# Patient Record
Sex: MALE | URBAN - METROPOLITAN AREA
[De-identification: names, ages, dates, MRNs, and addresses within clinical notes are randomized per-mention and may not be internally consistent; named-entity substitution may affect disease eponyms.]

---

## 2023-10-30 ENCOUNTER — APPOINTMENT (RX ONLY)
Dept: URBAN - METROPOLITAN AREA CLINIC 26 | Facility: CLINIC | Age: 43
Setting detail: DERMATOLOGY
End: 2023-10-30

## 2023-10-30 DIAGNOSIS — L82.1 OTHER SEBORRHEIC KERATOSIS: ICD-10-CM

## 2023-10-30 DIAGNOSIS — L81.4 OTHER MELANIN HYPERPIGMENTATION: ICD-10-CM

## 2023-10-30 PROCEDURE — 99203 OFFICE O/P NEW LOW 30 MIN: CPT

## 2023-10-30 PROCEDURE — ? COUNSELING

## 2023-10-30 ASSESSMENT — LOCATION ZONE DERM: LOCATION ZONE: FACE

## 2023-10-30 ASSESSMENT — LOCATION SIMPLE DESCRIPTION DERM
LOCATION SIMPLE: RIGHT CHEEK
LOCATION SIMPLE: LEFT CHEEK

## 2023-10-30 ASSESSMENT — LOCATION DETAILED DESCRIPTION DERM
LOCATION DETAILED: RIGHT INFERIOR LATERAL MALAR CHEEK
LOCATION DETAILED: LEFT CENTRAL MALAR CHEEK

## 2023-12-28 ENCOUNTER — APPOINTMENT (RX ONLY)
Dept: URBAN - METROPOLITAN AREA CLINIC 26 | Facility: CLINIC | Age: 43
Setting detail: DERMATOLOGY
End: 2023-12-28

## 2023-12-28 DIAGNOSIS — L81.4 OTHER MELANIN HYPERPIGMENTATION: ICD-10-CM

## 2023-12-28 DIAGNOSIS — D22 MELANOCYTIC NEVI: ICD-10-CM

## 2023-12-28 DIAGNOSIS — L82.1 OTHER SEBORRHEIC KERATOSIS: ICD-10-CM

## 2023-12-28 DIAGNOSIS — D18.0 HEMANGIOMA: ICD-10-CM

## 2023-12-28 PROBLEM — D18.01 HEMANGIOMA OF SKIN AND SUBCUTANEOUS TISSUE: Status: ACTIVE | Noted: 2023-12-28

## 2023-12-28 PROBLEM — D22.5 MELANOCYTIC NEVI OF TRUNK: Status: ACTIVE | Noted: 2023-12-28

## 2023-12-28 PROCEDURE — 99213 OFFICE O/P EST LOW 20 MIN: CPT

## 2023-12-28 PROCEDURE — ? SUNSCREEN RECOMMENDATIONS

## 2023-12-28 PROCEDURE — ? FULL BODY SKIN EXAM

## 2023-12-28 PROCEDURE — ? COUNSELING

## 2023-12-28 ASSESSMENT — LOCATION ZONE DERM: LOCATION ZONE: TRUNK

## 2023-12-28 ASSESSMENT — LOCATION SIMPLE DESCRIPTION DERM: LOCATION SIMPLE: UPPER BACK

## 2023-12-28 ASSESSMENT — LOCATION DETAILED DESCRIPTION DERM: LOCATION DETAILED: SUPERIOR THORACIC SPINE

## 2025-02-03 ENCOUNTER — APPOINTMENT (EMERGENCY)
Dept: RADIOLOGY | Facility: HOSPITAL | Age: 45
End: 2025-02-03

## 2025-02-03 ENCOUNTER — APPOINTMENT (EMERGENCY)
Dept: RADIOLOGY | Facility: HOSPITAL | Age: 45
End: 2025-02-03
Attending: STUDENT IN AN ORGANIZED HEALTH CARE EDUCATION/TRAINING PROGRAM

## 2025-02-03 ENCOUNTER — HOSPITAL ENCOUNTER (EMERGENCY)
Facility: HOSPITAL | Age: 45
Discharge: HOME | End: 2025-02-03
Attending: EMERGENCY MEDICINE
Payer: COMMERCIAL

## 2025-02-03 VITALS
RESPIRATION RATE: 18 BRPM | BODY MASS INDEX: 35.52 KG/M2 | WEIGHT: 268 LBS | HEIGHT: 73 IN | SYSTOLIC BLOOD PRESSURE: 156 MMHG | HEART RATE: 83 BPM | TEMPERATURE: 97.9 F | DIASTOLIC BLOOD PRESSURE: 86 MMHG | OXYGEN SATURATION: 99 %

## 2025-02-03 DIAGNOSIS — S62.639B OPEN FRACTURE OF DISTAL PHALANX OF DIGIT OF LEFT HAND: ICD-10-CM

## 2025-02-03 DIAGNOSIS — S68.119A: Primary | ICD-10-CM

## 2025-02-03 LAB
ALBUMIN SERPL-MCNC: 5 G/DL (ref 3.5–5.7)
ALP SERPL-CCNC: 48 IU/L (ref 34–125)
ALT SERPL-CCNC: 43 IU/L (ref 7–52)
ANION GAP SERPL CALC-SCNC: 9 MEQ/L (ref 3–15)
APTT PPP: 26 SEC (ref 23–35)
AST SERPL-CCNC: 26 IU/L (ref 13–39)
BASOPHILS # BLD: 0.08 K/UL
BASOPHILS NFR BLD: 1 %
BILIRUB SERPL-MCNC: 0.8 MG/DL (ref 0.3–1.2)
BUN SERPL-MCNC: 18 MG/DL (ref 7–25)
CALCIUM SERPL-MCNC: 10 MG/DL (ref 8.6–10.3)
CHLORIDE SERPL-SCNC: 100 MEQ/L (ref 98–107)
CO2 SERPL-SCNC: 29 MEQ/L (ref 21–31)
CREAT SERPL-MCNC: 1 MG/DL (ref 0.7–1.3)
DIFFERENTIAL METHOD BLD: NORMAL
EGFRCR SERPLBLD CKD-EPI 2021: 43.4 ML/MIN/1.73M*2
EOSINOPHIL # BLD: 0.07 K/UL
EOSINOPHIL NFR BLD: 0.8 %
ERYTHROCYTE [DISTWIDTH] IN BLOOD BY AUTOMATED COUNT: 12.7 %
ETHANOL SERPL-MCNC: <10 MG/DL
GLUCOSE SERPL-MCNC: 100 MG/DL (ref 70–99)
HCT VFR BLD AUTO: 47.2 %
HGB BLD-MCNC: 16.8 G/DL
IMM GRANULOCYTES # BLD AUTO: 0.06 K/UL
IMM GRANULOCYTES NFR BLD AUTO: 0.7 %
INR PPP: 1
LABORATORY COMMENT REPORT: NORMAL
LABORATORY COMMENT REPORT: NORMAL
LACTATE SERPL-SCNC: 2.3 MMOL/L (ref 0.4–2)
LYMPHOCYTES # BLD: 2.71 K/UL
LYMPHOCYTES NFR BLD: 32.3 %
MCH RBC QN AUTO: 29.6 PG
MCHC RBC AUTO-ENTMCNC: 35.6 G/DL
MCV RBC AUTO: 83.2 FL
MONOCYTES # BLD: 0.92 K/UL
MONOCYTES NFR BLD: 11 %
NEUTROPHILS # BLD: 4.54 K/UL
NEUTS SEG NFR BLD: 54.2 %
NRBC BLD-RTO: 0 %
PLATELET # BLD AUTO: 213 K/UL
PMV BLD AUTO: 10.7 FL
POTASSIUM SERPL-SCNC: 4.1 MEQ/L (ref 3.5–5.1)
PROT SERPL-MCNC: 7.7 G/DL (ref 6–8.2)
PROTHROMBIN TIME: 13.3 SEC (ref 12.2–14.5)
RBC # BLD AUTO: 5.67 M/UL
SODIUM SERPL-SCNC: 138 MEQ/L (ref 136–145)
SPECIMEN EXP DATE BLD: NORMAL
WBC # BLD AUTO: 8.38 K/UL

## 2025-02-03 PROCEDURE — 85730 THROMBOPLASTIN TIME PARTIAL: CPT | Performed by: STUDENT IN AN ORGANIZED HEALTH CARE EDUCATION/TRAINING PROGRAM

## 2025-02-03 PROCEDURE — 99285 EMERGENCY DEPT VISIT HI MDM: CPT | Mod: 25

## 2025-02-03 PROCEDURE — 73120 X-RAY EXAM OF HAND: CPT | Mod: XU,LT

## 2025-02-03 PROCEDURE — 0PSV0ZZ REPOSITION LEFT FINGER PHALANX, OPEN APPROACH: ICD-10-PCS | Performed by: SURGERY

## 2025-02-03 PROCEDURE — 73100 X-RAY EXAM OF WRIST: CPT | Mod: LT

## 2025-02-03 PROCEDURE — 73130 X-RAY EXAM OF HAND: CPT | Mod: LT

## 2025-02-03 PROCEDURE — 80053 COMPREHEN METABOLIC PANEL: CPT | Performed by: STUDENT IN AN ORGANIZED HEALTH CARE EDUCATION/TRAINING PROGRAM

## 2025-02-03 PROCEDURE — 25000000 HC PHARMACY GENERAL: Performed by: STUDENT IN AN ORGANIZED HEALTH CARE EDUCATION/TRAINING PROGRAM

## 2025-02-03 PROCEDURE — 96375 TX/PRO/DX INJ NEW DRUG ADDON: CPT

## 2025-02-03 PROCEDURE — 90715 TDAP VACCINE 7 YRS/> IM: CPT | Performed by: STUDENT IN AN ORGANIZED HEALTH CARE EDUCATION/TRAINING PROGRAM

## 2025-02-03 PROCEDURE — 3E0234Z INTRODUCTION OF SERUM, TOXOID AND VACCINE INTO MUSCLE, PERCUTANEOUS APPROACH: ICD-10-PCS | Performed by: EMERGENCY MEDICINE

## 2025-02-03 PROCEDURE — 99214 OFFICE O/P EST MOD 30 MIN: CPT | Performed by: SURGERY

## 2025-02-03 PROCEDURE — 68200000 HC TRAUMA RSPNS W/O CRIT CARE

## 2025-02-03 PROCEDURE — G0480 DRUG TEST DEF 1-7 CLASSES: HCPCS | Performed by: STUDENT IN AN ORGANIZED HEALTH CARE EDUCATION/TRAINING PROGRAM

## 2025-02-03 PROCEDURE — 85610 PROTHROMBIN TIME: CPT | Performed by: STUDENT IN AN ORGANIZED HEALTH CARE EDUCATION/TRAINING PROGRAM

## 2025-02-03 PROCEDURE — 63600000 HC DRUGS/DETAIL CODE: Mod: JZ | Performed by: STUDENT IN AN ORGANIZED HEALTH CARE EDUCATION/TRAINING PROGRAM

## 2025-02-03 PROCEDURE — 85025 COMPLETE CBC W/AUTO DIFF WBC: CPT | Performed by: STUDENT IN AN ORGANIZED HEALTH CARE EDUCATION/TRAINING PROGRAM

## 2025-02-03 PROCEDURE — 3E03329 INTRODUCTION OF OTHER ANTI-INFECTIVE INTO PERIPHERAL VEIN, PERCUTANEOUS APPROACH: ICD-10-PCS | Performed by: SURGERY

## 2025-02-03 PROCEDURE — 63600000 HC DRUGS/DETAIL CODE: Mod: JZ | Performed by: SURGERY

## 2025-02-03 PROCEDURE — 96374 THER/PROPH/DIAG INJ IV PUSH: CPT | Mod: 59

## 2025-02-03 PROCEDURE — 25000000 HC PHARMACY GENERAL

## 2025-02-03 PROCEDURE — 96365 THER/PROPH/DIAG IV INF INIT: CPT

## 2025-02-03 PROCEDURE — 3E033NZ INTRODUCTION OF ANALGESICS, HYPNOTICS, SEDATIVES INTO PERIPHERAL VEIN, PERCUTANEOUS APPROACH: ICD-10-PCS | Performed by: SURGERY

## 2025-02-03 PROCEDURE — 26765 TREAT FINGER FRACTURE EACH: CPT | Mod: F2

## 2025-02-03 PROCEDURE — 63600000 HC DRUGS/DETAIL CODE

## 2025-02-03 PROCEDURE — 96375 TX/PRO/DX INJ NEW DRUG ADDON: CPT | Mod: 59

## 2025-02-03 PROCEDURE — 83605 ASSAY OF LACTIC ACID: CPT | Performed by: STUDENT IN AN ORGANIZED HEALTH CARE EDUCATION/TRAINING PROGRAM

## 2025-02-03 PROCEDURE — 36415 COLL VENOUS BLD VENIPUNCTURE: CPT | Performed by: STUDENT IN AN ORGANIZED HEALTH CARE EDUCATION/TRAINING PROGRAM

## 2025-02-03 PROCEDURE — 90471 IMMUNIZATION ADMIN: CPT | Performed by: STUDENT IN AN ORGANIZED HEALTH CARE EDUCATION/TRAINING PROGRAM

## 2025-02-03 RX ORDER — LIDOCAINE HYDROCHLORIDE 10 MG/ML
30 INJECTION, SOLUTION EPIDURAL; INFILTRATION; INTRACAUDAL; PERINEURAL ONCE
Status: COMPLETED | OUTPATIENT
Start: 2025-02-03 | End: 2025-02-03

## 2025-02-03 RX ORDER — FENTANYL CITRATE 50 UG/ML
INJECTION, SOLUTION INTRAMUSCULAR; INTRAVENOUS
Status: COMPLETED | OUTPATIENT
Start: 2025-02-03 | End: 2025-02-03

## 2025-02-03 RX ORDER — CLINDAMYCIN PHOSPHATE 900 MG/50ML
900 INJECTION, SOLUTION INTRAVENOUS ONCE
Status: COMPLETED | OUTPATIENT
Start: 2025-02-03 | End: 2025-02-03

## 2025-02-03 RX ORDER — CLINDAMYCIN PHOSPHATE 600 MG/50ML
600 INJECTION, SOLUTION INTRAVENOUS ONCE
Status: DISCONTINUED | OUTPATIENT
Start: 2025-02-03 | End: 2025-02-03

## 2025-02-03 RX ORDER — LIDOCAINE HYDROCHLORIDE 10 MG/ML
INJECTION, SOLUTION EPIDURAL; INFILTRATION; INTRACAUDAL; PERINEURAL
Status: COMPLETED
Start: 2025-02-03 | End: 2025-02-03

## 2025-02-03 RX ORDER — HYDROMORPHONE HYDROCHLORIDE 1 MG/ML
0.5 INJECTION, SOLUTION INTRAMUSCULAR; INTRAVENOUS; SUBCUTANEOUS ONCE
Status: COMPLETED | OUTPATIENT
Start: 2025-02-03 | End: 2025-02-03

## 2025-02-03 RX ORDER — CEFTRIAXONE 1 G/50ML
1 INJECTION, SOLUTION INTRAVENOUS
Status: DISCONTINUED | OUTPATIENT
Start: 2025-02-03 | End: 2025-02-03

## 2025-02-03 RX ORDER — CLINDAMYCIN HYDROCHLORIDE 300 MG/1
300 CAPSULE ORAL 3 TIMES DAILY
Qty: 21 CAPSULE | Refills: 0 | Status: SHIPPED | OUTPATIENT
Start: 2025-02-03 | End: 2025-02-13

## 2025-02-03 RX ORDER — TRAMADOL HYDROCHLORIDE 50 MG/1
50 TABLET ORAL EVERY 6 HOURS PRN
Qty: 10 TABLET | Refills: 0 | Status: SHIPPED | OUTPATIENT
Start: 2025-02-03

## 2025-02-03 RX ADMIN — CLINDAMYCIN PHOSPHATE 900 MG: 900 INJECTION, SOLUTION INTRAVENOUS at 14:35

## 2025-02-03 RX ADMIN — LIDOCAINE HYDROCHLORIDE: 10 INJECTION, SOLUTION EPIDURAL; INFILTRATION; INTRACAUDAL; PERINEURAL at 15:19

## 2025-02-03 RX ADMIN — TETANUS TOXOID, REDUCED DIPHTHERIA TOXOID AND ACELLULAR PERTUSSIS VACCINE, ADSORBED 0.5 ML: 5; 2.5; 8; 8; 2.5 SUSPENSION INTRAMUSCULAR at 14:32

## 2025-02-03 RX ADMIN — FENTANYL CITRATE 50 MCG: 50 INJECTION, SOLUTION INTRAMUSCULAR; INTRAVENOUS at 14:33

## 2025-02-03 RX ADMIN — FENTANYL CITRATE 50 MCG: 50 INJECTION, SOLUTION INTRAMUSCULAR; INTRAVENOUS at 14:36

## 2025-02-03 RX ADMIN — HYDROMORPHONE HYDROCHLORIDE 0.5 MG: 1 INJECTION, SOLUTION INTRAMUSCULAR; INTRAVENOUS; SUBCUTANEOUS at 15:58

## 2025-02-03 RX ADMIN — LIDOCAINE HYDROCHLORIDE 30 ML: 10 INJECTION, SOLUTION EPIDURAL; INFILTRATION; INTRACAUDAL; PERINEURAL at 14:35

## 2025-02-03 ASSESSMENT — ENCOUNTER SYMPTOMS
VOMITING: 0
DIFFICULTY BREATHING: 0
NECK PAIN: 0
NAUSEA: 0
SEIZURES: 0
LOSS OF CONSCIOUSNESS: 0
BACK PAIN: 0
ABDOMINAL PAIN: 0
HEADACHES: 0

## 2025-02-03 NOTE — CONSULTS
"   TRAUMA SURGERY:  CONSULT       PATIENT NAME:  Abby Villegas YOB: 1899    AGE:  89 y.o.  GENDER: adult   MRN:  [id number]  PATIENT #: [id number]     Chief Complaint   Patient presents with    Trauma    Amputation       Activation Time: 1410 Level of Trauma: CODE 9   Trauma Arrival Time: 1423 Time Patient Seen: 1423      HISTORY OF PRESENT ILLNESS/INJURY     Mechanism of Injury:  hand vs saw    Approx 42 yoM was BIBEMS on 2/3/2025 following a work accident. The patient notes he was using a circular saw and the wood accidentally popped up causing his left hand to get pulled into the spinning blade.Pt noted pain, bleeding and loss of sensation in the left 3rd finger. He did not pass out. He did not sustain any falls. The piece of wood did not strike his body. No other traumatic injuries present    Patient is right hand dominant     Relevant PMH: NONE     Pharmacological Risk Factors:   Oral Anti-Coagulation: NONE   Antiplatelet Therapy: NONE    Patient Vitals for the past 24 hrs:   BP Temp Temp src Pulse Resp SpO2 Height Weight   02/03/25 1515 (!) 184/99 -- -- 86 16 98 % -- --   02/03/25 1428 -- -- -- -- -- -- 1.854 m (6' 1\") 122 kg (268 lb)   02/03/25 1428 (!) 222/113 -- -- 96 15 98 % -- --   02/03/25 1427 130/65 36.6 °C (97.9 °F) Oral (!) 108 17 100 % -- --        REVIEW OF SYSTEMS     14 point ROS completed and pertinent positives as listed in HPI or as stated. All others negative.    PAST MEDICAL HISTORY     None    PAST SURGICAL HISTORY     Orthopedics surgeries     FAMILY HISTORY     Non-contributory    SOCIAL HISTORY     Social History     Socioeconomic History    Marital status: Single     Spouse name: Not on file    Number of children: Not on file    Years of education: Not on file    Highest education level: Not on file   Occupational History    Not on file   Tobacco Use    Smoking status: Not on file    Smokeless tobacco: Not on file   Substance and Sexual Activity    Alcohol " use: Not on file    Drug use: Not on file    Sexual activity: Not on file   Other Topics Concern    Not on file   Social History Narrative    Not on file     Social Drivers of Health     Financial Resource Strain: Not on file   Food Insecurity: Not on file   Transportation Needs: Not on file   Physical Activity: Not on file   Stress: Not on file   Social Connections: Not on file   Intimate Partner Violence: Not on file   Housing Stability: Not on file     HOME MEDICATIONS     Prescriptions Prior to Admission[1]     None  ALLERGIES     Keflex    PRIMARY CARE PHYSICIAN   Community Outreach, No Pcp Confirmed  PRIMARY SURVEY     Airway: Patent   Breathing: Spontaneous, non-labored,  B/L breath sounds  Circulation:  Skin is pink/warm/dry, central and peripheral pulses present.  NSR   Disability: Initial GCS: 15. Awake/Alert & Bladimir.  Negative LOC  Exposure: Left 3rd finger partial amputation     EYES:  4 = open spontaneously  3 = open to voice  2 = open to pain  1 = none VERBAL:  5 = oriented  4 = confused  3 = inappropriate words  2 = incomprehensible sounds  1 = none MOTOR:  6 = obeys   5 = localizes  4 = withdraws  3 = decortication (flexion)  2 = decerebration (extension)  1 = none or triple flexion     RESUSCITATION:     O2: RA Lines/Location: PIV  Two large bore PIVs:  Yes    Endotracheal Intubation: no Gastric Intubation: NONE   IVF/Blood: N/A Antibiotic(s): Clindamycin   Chest Tube(s):   R size:             L size: Tetanus:  Given in ED   [place]: no        SECONDARY SURVEY     NEURO: GCS 15.  AAOx3. Non-focal on exam. Sensation Intact.    R L MOTOR (0-5):   5 5 C4 (deltoid)   5 5 C5 (biceps)   5 5 C6 (wrist ext)   5 5 C7 (triceps)   5 Limited exam due to pain/partial amputation C8 (finger flexion)   5 Limited exam due to pain/partial amputation T1 (hand intrinsics)   5 5 L2 (hip flexors)   5 5 L3 (quads) knee ext   5 5 L4 (TA) dorsiflex   5 5 L5 (EHL)    5 5 S1 (gastrocs) plantar flex     Anal Contraction:  yes  Anal Sensation: yes    HEENT: NCAT. Midface is stable. NO malocclusion. EOMi. Neck supple. Trachea ML.   SPINE: Denies midline c-spine tenderness. Denies T/L/S spine tenderness. There are no stepoffs/deformities.   CHEST: Equal chest expansion, denies chest wall tenderness, no crepitus.  LUNGS: Clear bilaterally. No use of accessory muscles or respiratory distress.   CV: RRR, +2 radial/DP/femoral pulses.  ABDOMEN: Soft, nontender, nondistended.   PELVIS: Stable, non-tender.   : Genitalia deferred.  RECTAL: Digital rectal exam deferred.   EXTREMITIES: Full ROM of the B/L LE and RUE. L 3rd finger with partial amputation of the distal phalanx   SKIN: warm, dry    Fast Exam: Deferred by Tony Menchaca PA-C with Eliu Mercado MD     DIAGNOSTIC DATA     LABS:  Recent Results (from the past 24 hours)   CBC and Differential    Collection Time: 02/03/25  2:43 PM   Result Value Ref Range    WBC 8.38   K/uL    RBC 5.67 M/uL    Hemoglobin 16.8   g/dL    Hematocrit 47.2   %    MCV 83.2 fL    MCH 29.6 pg    MCHC 35.6 g/dL    RDW 12.7 %    Platelets 213   K/uL    MPV 10.7 fL    Differential Type Auto     nRBC 0.0 <=0.0 %    Immature Granulocytes 0.7 %    Neutrophils 54.2 %    Lymphocytes 32.3 %    Monocytes 11.0 %    Eosinophils 0.8 %    Basophils 1.0 %    Immature Granulocytes, Absolute 0.06 K/uL    Neutrophils, Absolute 4.54 K/uL    Lymphocytes, Absolute 2.71 K/uL    Monocytes, Absolute 0.92 K/uL    Eosinophils, Absolute 0.07 K/uL    Basophils, Absolute 0.08 K/uL   Comprehensive metabolic panel    Collection Time: 02/03/25  2:43 PM   Result Value Ref Range    Sodium 138 136 - 145 mEQ/L    Potassium 4.1 3.5 - 5.1 mEQ/L    Chloride 100 98 - 107 mEQ/L    CO2 29 21 - 31 mEQ/L    BUN 18 7 - 25 mg/dL    Creatinine 1.0 0.7 - 1.3 mg/dL    Glucose 100 (H) 70 - 99 mg/dL    Calcium 10.0 8.6 - 10.3 mg/dL    AST (SGOT) 26 13 - 39 IU/L    ALT (SGPT) 43 7 - 52 IU/L    Alkaline Phosphatase 48 34 - 125 IU/L    Total Protein 7.7 6.0  - 8.2 g/dL    Albumin 5.0 3.5 - 5.7 g/dL    Bilirubin, Total 0.8 0.3 - 1.2 mg/dL    eGFR 43.4 (L) >=60.0 mL/min/1.73m*2    Anion Gap 9 3 - 15 mEQ/L   Ethanol    Collection Time: 02/03/25  2:43 PM   Result Value Ref Range    Ethanol <10 <10 mg/dL   Lactic acid, Venous    Collection Time: 02/03/25  2:43 PM   Result Value Ref Range    Lactate 2.3 (H) 0.4 - 2.0 mmol/L   Protime-INR    Collection Time: 02/03/25  2:43 PM   Result Value Ref Range    PT 13.3 12.2 - 14.5 sec    INR 1.0     PTT    Collection Time: 02/03/25  2:43 PM   Result Value Ref Range    PTT 26 23 - 35 sec   Type and Screen [facility] Lab    Collection Time: 02/03/25  2:43 PM   Result Value Ref Range    Specimen Expiration 02/06/2025     History Check No type on file     History Check No type on file         Creatinine clearance cannot be calculated (Invalid equation in interface table)    IMAGINGS:  X-RAY HAND LEFT 2 VIEWS    Result Date: 2/3/2025  IMPRESSION: Comminuted, intra-articular fractures of third middle and distal phalanges with traumatic disruption of third distal interphalangeal joint, adjacent fracture fragments, lacerations and soft tissue swelling. See above.      CONSULTS      Consultant Emergent  Urgent    Routine Time Paged Time Responded EMERGENT Arrival Time   Ortho/hand Urgent 4021 1421                  *emergent requires <30 minutes response on site; urgent requires <12 hours response on site.      IMPRESSION/PLAN     Approx 42yoM s/p hand vs saw    Isolated trauma to the left 3rd finger..    Ortho/hand evaluated at the bedside and extensively irrigated the wound. Clindamycin given in setting of Keflex allergy. Tetanus updated. Hand repaired and immobilized the 3rd finger injury.    Per hand team - the patient is stable for discharge and should follow up with Dr. Polk in 1 week. ABX X1 week. NWB in the L hand until cleared by hand team    No further trauma work up at this time    DISPOSITION:  Dispo per ED     CODE STATUS: No  Order    Final recommendations are pending attending co-signature.    KEVIN Reese  Service Pager: #[phone]  Phone: [phone]  2/3/2025  3:59 PM           [1] (Not in a hospital admission)

## 2025-02-03 NOTE — PROGRESS NOTES
SW responded to trauma code 9. Patient presents for injury to middle finger on his left hand. Transported by [facility]. SW met with patient in trauma bay. 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 Patient states he was using a circular saw when his finger accident got pulled into the spinning blade. Patient declined for SW to contact family/friends and reports he notified his wife.     SW will continue to follow for emotional support and dispo planning.   МАРИНА Garcia

## 2025-02-03 NOTE — ED PROVIDER NOTES
Emergency Medicine Note  HPI   HISTORY OF PRESENT ILLNESS       History provided by:  Patient  Trauma  Mechanism of injury: amputation    Injury location:  Finger  Finger injury location:  L long finger  Incident location:  Around machinery  Time since incident:  30 minutes  Arrived directly from scene: yes    Amputation:     Extent:  Partial    Mechanism:  Crush    Cause:  Power tool  Protective equipment: none    Suspicion of alcohol use: no    Suspicion of drug use: no    Tetanus status:  Unknown  Prior to arrival data:     Bystander interventions:  Wound care    Patient ambulatory at scene: yes      Blood loss:  Moderate    Responsiveness at scene:  Alert    Orientation at scene:  Person, place, situation and time    Loss of consciousness: no      Amnesic to event: no      Airway interventions:  None    Breathing interventions:  None    IV access status:  None    IO access:  None    Fluids administered:  None    Cardiac interventions:  None    Medications administered:  None    Immobilization:  None    Airway condition since incident:  Stable    Breathing condition since incident:  Stable    Circulation condition since incident:  Stable    Mental status condition since incident:  Stable    Disability condition since incident:  Stable  Associated symptoms: no abdominal pain, no back pain, no chest pain, no difficulty breathing, no headaches, no loss of consciousness, no nausea, no neck pain, no seizures and no vomiting          Patient History   PAST HISTORY     Reviewed from Nursing Triage:  Allergies       No past medical history on file.    No past surgical history on file.    No family history on file.           Review of Systems   REVIEW OF SYSTEMS     Review of Systems   Cardiovascular:  Negative for chest pain.   Gastrointestinal:  Negative for abdominal pain, nausea and vomiting.   Musculoskeletal:  Negative for back pain and neck pain.   Neurological:  Negative for seizures, loss of consciousness and  headaches.         VITALS     ED Vitals      Date/Time Temp Pulse Resp BP SpO2 [facility]   02/03/25 1515 -- 86 16 184/99 98 % [facility]   02/03/25 1428 -- 96 15 222/113 98 % [facility]   02/03/25 1427 36.6 °C (97.9 °F) 108 17 130/65 100 % S[facility]                         Physical Exam   PHYSICAL EXAM     Physical Exam  Vitals and nursing note reviewed.   Constitutional:       Appearance: Abby is well-developed.   HENT:      Head: Normocephalic and atraumatic.   Eyes:      Conjunctiva/sclera: Conjunctivae normal.   Cardiovascular:      Rate and Rhythm: Normal rate and regular rhythm.   Pulmonary:      Effort: Pulmonary effort is normal.      Breath sounds: Normal breath sounds.   Abdominal:      General: There is no distension.      Palpations: Abdomen is soft. There is no mass.      Tenderness: There is no abdominal tenderness.   Musculoskeletal:         General: Tenderness, deformity and signs of injury present. Normal range of motion.      Cervical back: Normal range of motion.      Comments: Left middle digit   Skin:     General: Skin is warm and dry.   Neurological:      Mental Status: Abby is alert. Mental status is at baseline.   Psychiatric:         Behavior: Behavior normal.           PROCEDURES     Critical Care    Performed by: Sumaya Pratida MD  Authorized by: Sumaya Partida MD    Critical care provider statement:     Critical care time (minutes):  20    Critical care was necessary to treat or prevent imminent or life-threatening deterioration of the following conditions:  Trauma       DATA     Results       Procedure Component Value Units Date/Time    Type and Screen [facility] [[id number]] Collected: 02/03/25 1443    Specimen: Blood, Venous Updated: 02/03/25 1517     Specimen Expiration 02/06/2025     History Check No type on file    Protime-INR [[id number]]  (Normal) Collected: 02/03/25 1443    Specimen: Blood, Venous Updated: 02/03/25 1507     PT 13.3 sec      INR 1.0     Comment: INR has no defined significance  when PT is within Reference Range.       PTT [[id number]]  (Normal) Collected: 02/03/25 1443    Specimen: Blood, Venous Updated: 02/03/25 1507     PTT 26 sec     Ethanol [[id number]]  (Normal) Collected: 02/03/25 1443    Specimen: Blood, Venous Updated: 02/03/25 1505     Ethanol <10 mg/dL     Comprehensive metabolic panel [[id number]]  (Abnormal) Collected: 02/03/25 1443    Specimen: Blood, Venous Updated: 02/03/25 1504     Sodium 138 mEQ/L      Potassium 4.1 mEQ/L      Comment: Results obtained on plasma. Plasma Potassium values may be up to 0.4 mEQ/L less than serum values. The differences may be greater for patients with high platelet or white cell counts.        Chloride 100 mEQ/L      CO2 29 mEQ/L      BUN 18 mg/dL      Creatinine 1.0 mg/dL      Glucose 100 mg/dL      Calcium 10.0 mg/dL      AST (SGOT) 26 IU/L      ALT (SGPT) 43 IU/L      Alkaline Phosphatase 48 IU/L      Total Protein 7.7 g/dL      Comment: Test performed on plasma which typically contains approximately 0.4 g/dL more protein than serum.        Albumin 5.0 g/dL      Bilirubin, Total 0.8 mg/dL      eGFR 43.4 mL/min/1.73m*2      Comment: Calculation based on the Chronic Kidney Disease Epidemiology Collaboration (CKD-EPI) equation refit without adjustment for race.        Anion Gap 9 mEQ/L     Lactic acid, Venous [[id number]]  (Abnormal) Collected: 02/03/25 1443    Specimen: Blood, Venous Updated: 02/03/25 1451     Lactate 2.3 mmol/L     CBC and Differential [[id number]] Collected: 02/03/25 1443    Specimen: Blood, Venous Updated: 02/03/25 1450     WBC 8.38 K/uL      RBC 5.67 M/uL      Hemoglobin 16.8 g/dL      Hematocrit 47.2 %      MCV 83.2 fL      MCH 29.6 pg      MCHC 35.6 g/dL      RDW 12.7 %      Platelets 213 K/uL      MPV 10.7 fL      Differential Type Auto     nRBC 0.0 %      Immature Granulocytes 0.7 %      Neutrophils 54.2 %      Lymphocytes 32.3 %      Monocytes 11.0 %      Eosinophils 0.8 %      Basophils 1.0 %      Immature Granulocytes,  Absolute 0.06 K/uL      Neutrophils, Absolute 4.54 K/uL      Lymphocytes, Absolute 2.71 K/uL      Monocytes, Absolute 0.92 K/uL      Eosinophils, Absolute 0.07 K/uL      Basophils, Absolute 0.08 K/uL             Imaging Results              X-RAY HAND LEFT 2 VIEWS (Final result)  Result time 02/03/25 14:49:05   Procedure changed from X-RAY HAND BILATERAL 2 VW     Final result                   Impression:    IMPRESSION:    Comminuted, intra-articular fractures of third middle and distal phalanges with  traumatic disruption of third distal interphalangeal joint, adjacent fracture  fragments, lacerations and soft tissue swelling. See above.               Narrative:    CLINICAL HISTORY:  Patient status post trauma.    COMPARISON:  None.    TECHNIQUE:  AP and lateral views of the left hand are obtained.    COMMENT:    There is a comminuted, intra-articular fracture of the third distal phalanx with  nonvisualization of the radial portion of the phalanx, presumably due to crush  injury. There is a similar but smaller defect in the distal radial aspect of the  third middle phalanx. Small fracture fragments are seen in the adjacent soft  tissues. Multiple, irregularly shaped lucencies in the soft tissues of the  distal third digit represent lacerations. Therefore, the aforementioned  fractures constitute open fractures. There is mild, fusiform soft tissue  swelling in the mid and distal third digit with overlying dressing material.  Elsewhere in the hand, the joint spaces are normally maintained.                                      No orders to display       Scoring tools                                  ED Course & MDM   MDM / ED COURSE / CLINICAL IMPRESSION / DISPO     Medical Decision Making  44-year-old male presents for evaluation after saw injury at work.  Patient avulsed his left middle digit.  Trauma code 9 called.     Problems Addressed:  Amputation of digit of left hand, initial encounter: acute illness or  injury  Open fracture of distal phalanx of digit of left hand: acute illness or injury    Amount and/or Complexity of Data Reviewed  Independent Historian: EMS  Labs:  Decision-making details documented in ED Course.  Radiology:  Decision-making details documented in ED Course.  Discussion of management or test interpretation with external provider(s): Ortho and trauma    Risk  Prescription drug management.  Risk Details: Consideration for admission but ultimately will plan for discharge. There is no current indication for admission based on labs, imaging, clinical course, and presenting complaint  that was found during the emergency department visit.              ED Course as of 02/03/25 1716   Mon Feb 03, 2025   1716 Patient irrigated and repaired by trauma.  Tolerating p.o.  Will discharge on 10-day course of clindamycin and pain medication with instruction to follow-up with hand soon as possible for further outpatient workup and evaluation. [KR]      ED Course User Index  [KR] Sumaya Partida MD     Clinical Impression      None                 Sumaya Partida MD  02/04/25 3482

## 2025-02-03 NOTE — PROGRESS NOTES
Orthopedic Hand Surgery Consult Note    Subjective     Abby Villegas is a 89 y.o. adult right-hand-dominant who presented to the [facility] emergency room after a tablesaw injury at home.  He sustained injuries to his left distal phalanx third digit.  He received clindamycin in the ER, tetanus shot, washed out with liter of normal saline.  He had complaint of diminished sensation in the distal segment of his left middle finger, extremely painful but denied any tingling.     He denied any other pain or injuries.  No evidence of arterial bleeding      Patient seen and examined at 1420.       Medical History:   No past medical history on file.    Surgical History:   No past surgical history on file.    Social History:   Social History     Social History Narrative    Not on file       Family History:   No family history on file.    Allergies: Patient has no known allergies.    Current Medications[1]    Review of Systems  See HPI    Objective   Labs  CBC Results         02/03/25     1443    WBC 8.38    RBC 5.67    HGB 16.8    HCT 47.2    MCV 83.2    MCH 29.6    MCHC 35.6               BMP Results         02/03/25     1443        K 4.1    Cl 100    CO2 29    Glucose 100    BUN 18    Creatinine 1.0    Calcium 10.0    Anion Gap 9    EGFR 43.4           Comment for K at 1443 on 02/03/25: Results obtained on plasma. Plasma Potassium values may be up to 0.4 mEQ/L less than serum values. The differences may be greater for patients with high platelet or white cell counts.    Comment for EGFR at 1443 on 02/03/25: Calculation based on the Chronic Kidney Disease Epidemiology Collaboration (CKD-EPI) equation refit without adjustment for race.               Imaging  XR left hand demonstrates left third digit distal phalanx partial amputation    XR L hand post splint: L 3rd digit s/p lac repair and splinting, demonstrating mildly improved alignment, still with significant bone loss       Physical  Exam  Temp:  [36.6 °C (97.9 °F)] 36.6 °C (97.9 °F)  Heart Rate:  [] 83  Resp:  [15-18] 18  BP: (130-222)/() 156/86  SpO2:  [98 %-100 %] 99 %     General: AVSS, NAD  Head: NC/AT  Resp: no labored breathing  Neuro: awake, alert  MSK:     RUE:  Right hand   Full ROM at MCP/PIP/DIP joints of digits 1-5  Cascade sign intact  Wrist flexion/extension intact  Palpable 2+ radial artery pulses    -Skin intact, no abrasions, no lacerations   -Compartments soft and compressible  -No TTP of bony prominences  -No pain or crepitus with ROM of shoulder/elbow/wrist/fingers  -No palpable defect to Fingers/Hand/Wrist/Forearm/Elbow/Humerus/Shoulder/Clavicle  -SILT median/ulnar/radial  -Motor intact to axillary/musculocutaneous/radial/ulnar/median/AIN/PIN  -Radial Pulse 2+  -Hand wwp, BCR    LUE:  Left hand  Full ROM at MCP/PIP/DIP joints of digits 1,2,4,5   Full ROM of third digit MCP/PIP, unable to flex or extend DP secondary to pain and injury  Cascade sign intact  Wrist flexion/extension intact  Palpable 2+ radial artery pulses  Left distal segment of third digit with 97% on pulse oximeter placed on distal segment    -Skin intact, no abrasions, no lacerations   -Compartments soft and compressible  -No TTP of bony prominences  -No pain or crepitus with ROM of shoulder/elbow/wrist/fingers  -No palpable defect to Fingers/Hand/Wrist/Forearm/Elbow/Humerus/Shoulder/Clavicle  -SILT median/ulnar/radial  -Diminished sensation and radial digital nerve distribution left third distal phalanx, ulnar digital nerve distribution sensation intact, no paresthesias  -Motor intact to axillary/musculocutaneous/radial/ulnar/median/AIN/PIN  -Radial Pulse 2+  -Hand wwp, BCR    Procedure:   BEDSIDE PROCEDURE NOTE    PROCEDURE: Laceration Repair.     STERILIZATION:  Alcohol and normal saline .   ANESTHESIA: 20 mL of 1% Lidocaine.   FINDINGS: Upon evaluation, patient was found to have multiple longitudinal laceration overlying the dorsal and  volar, radial sided third digit DIP.     TECHNIQUE:    The wound was examined, prepared and draped. Anesthesia was achieved with local infiltration. The laceration was thoroughly irrigated with sterile saline. It was examined and cleared of any debris or foreign objects.  Upon examination, active bleeding was identified and hemostasis was achieved. The laceration was further irrigated and cleaned before closing.     Skin edges were loosely approximated using 4-0 Chromic Gut suture with simple interrupted.      Following the procedure, the patient was neurovascularly intact as with previous exam with 2+ pulses, and full motor and sensory.  He still remained with diminished sensation to the distal segment of his third digit DIP    COMPLICATIONS: No immediate complications.    EBL: 5 cc       ASSISTED BY: Dr. Belen Kuhn    ---------------------------------------------    ---------------------------------------------      Assessment   89 y.o. adult being consulted for left third digit DIP partial amputation status post tablesaw injury. No further acute orthopedic surgical intervention indicated at this time.     Plan   -No further orthopedic surgical intervention indicated at this time   -Recommend outpatient follow-up with Dr. Polk  -Multimodal pain control and lidocaine patches as needed  -Elevation of affected extremity recommended  -Weightbearing nonweightbearing to the left upper extremity, hand  -P.o. clindamycin x 7 days  -Discussed with Dr. Polk who agrees with plan    Zach Lozano DO  Orthopaedic Surgery Resident, PGY-1  [place] Pager: [phone]         [1]   Current Inpatient Medications   Medication Dose Route Frequency Provider Last Rate Last Admin    fentaNYL (PF) (SUBLIMAZE) injection   intravenous Once via [facility] medication administration Merritt Mercado MD   50 mcg at 02/03/25 6035    fentaNYL (PF) (SUBLIMAZE) injection   intravenous Once via [facility] medication administration Merritt Mercado  MD KERRI   50 mcg at 02/03/25 5339

## 2025-02-04 PROBLEM — S62.633B DISPLACED FRACTURE OF DISTAL PHALANX OF LEFT MIDDLE FINGER, INITIAL ENCOUNTER FOR OPEN FRACTURE: Status: ACTIVE | Noted: 2025-02-04
